# Patient Record
Sex: MALE | Race: WHITE | Employment: FULL TIME | URBAN - METROPOLITAN AREA
[De-identification: names, ages, dates, MRNs, and addresses within clinical notes are randomized per-mention and may not be internally consistent; named-entity substitution may affect disease eponyms.]

---

## 2017-09-06 ENCOUNTER — OFFICE VISIT (OUTPATIENT)
Dept: FAMILY MEDICINE CLINIC | Age: 42
End: 2017-09-06

## 2017-09-06 VITALS
HEART RATE: 70 BPM | WEIGHT: 180.6 LBS | DIASTOLIC BLOOD PRESSURE: 82 MMHG | RESPIRATION RATE: 18 BRPM | SYSTOLIC BLOOD PRESSURE: 126 MMHG | TEMPERATURE: 98.2 F | OXYGEN SATURATION: 90 % | HEIGHT: 78 IN | BODY MASS INDEX: 20.9 KG/M2
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
## 2017-09-06 DIAGNOSIS — R07.9 CHEST PAIN, UNSPECIFIED TYPE: Primary | ICD-10-CM

## 2017-09-06 PROCEDURE — 99214 OFFICE O/P EST MOD 30 MIN: CPT | Performed by: FAMILY MEDICINE

## 2017-09-06 PROCEDURE — 93000 ELECTROCARDIOGRAM COMPLETE: CPT | Performed by: FAMILY MEDICINE

## 2017-09-06 RX ORDER — METHYLPREDNISOLONE 4 MG/1
TABLET ORAL
Qty: 1 KIT | Refills: 0 | Status: SHIPPED | OUTPATIENT
Start: 2017-09-06 | End: 2017-09-12

## 2017-09-06 ASSESSMENT — PATIENT HEALTH QUESTIONNAIRE - PHQ9
SUM OF ALL RESPONSES TO PHQ QUESTIONS 1-9: 0
2. FEELING DOWN, DEPRESSED OR HOPELESS: 0
SUM OF ALL RESPONSES TO PHQ9 QUESTIONS 1 & 2: 0
1. LITTLE INTEREST OR PLEASURE IN DOING THINGS: 0

## 2024-05-07 ENCOUNTER — OFFICE VISIT (OUTPATIENT)
Dept: PRIMARY CARE | Facility: CLINIC | Age: 49
End: 2024-05-07
Payer: COMMERCIAL

## 2024-05-07 VITALS
TEMPERATURE: 97.1 F | DIASTOLIC BLOOD PRESSURE: 82 MMHG | WEIGHT: 177.6 LBS | SYSTOLIC BLOOD PRESSURE: 112 MMHG | OXYGEN SATURATION: 92 % | HEIGHT: 78 IN | BODY MASS INDEX: 20.55 KG/M2 | HEART RATE: 79 BPM | RESPIRATION RATE: 16 BRPM

## 2024-05-07 DIAGNOSIS — Z13.220 LIPID SCREENING: ICD-10-CM

## 2024-05-07 DIAGNOSIS — H66.92 CHRONIC OTITIS MEDIA OF LEFT EAR WITH POSTERIOR PERFORATION: ICD-10-CM

## 2024-05-07 DIAGNOSIS — Z76.89 ENCOUNTER TO ESTABLISH CARE: ICD-10-CM

## 2024-05-07 DIAGNOSIS — R05.1 ACUTE COUGH: ICD-10-CM

## 2024-05-07 DIAGNOSIS — K02.9 DENTAL CARIES: ICD-10-CM

## 2024-05-07 DIAGNOSIS — R53.83 MALAISE AND FATIGUE: ICD-10-CM

## 2024-05-07 DIAGNOSIS — R53.81 MALAISE AND FATIGUE: ICD-10-CM

## 2024-05-07 DIAGNOSIS — H72.92 CHRONIC OTITIS MEDIA OF LEFT EAR WITH POSTERIOR PERFORATION: ICD-10-CM

## 2024-05-07 DIAGNOSIS — J40 SINOBRONCHITIS: Primary | ICD-10-CM

## 2024-05-07 DIAGNOSIS — J32.9 SINOBRONCHITIS: Primary | ICD-10-CM

## 2024-05-07 PROCEDURE — 99203 OFFICE O/P NEW LOW 30 MIN: CPT | Performed by: FAMILY MEDICINE

## 2024-05-07 PROCEDURE — 3008F BODY MASS INDEX DOCD: CPT | Performed by: FAMILY MEDICINE

## 2024-05-07 RX ORDER — LEVOFLOXACIN 500 MG/1
500 TABLET, FILM COATED ORAL DAILY
Qty: 10 TABLET | Refills: 0 | Status: SHIPPED | OUTPATIENT
Start: 2024-05-07 | End: 2024-05-17

## 2024-05-07 ASSESSMENT — PATIENT HEALTH QUESTIONNAIRE - PHQ9
SUM OF ALL RESPONSES TO PHQ9 QUESTIONS 1 AND 2: 0
2. FEELING DOWN, DEPRESSED OR HOPELESS: NOT AT ALL
1. LITTLE INTEREST OR PLEASURE IN DOING THINGS: NOT AT ALL

## 2024-05-07 NOTE — PROGRESS NOTES
"Subjective   Patient ID: Duncan Quezada is a 48 y.o. male who presents for Establish Care and Cough.    HPI    Re est care  pt is being seen for cough  ongoing for 1 week   other symptoms includes loss of voice due to the cough  pt is taking Day Quill some relief   Denies any recent travel.  Was recently camping.   Started as a runny nose on Sunday.   Smoking 1/2 pack daily.  He states when he is laying down, he is coughing.    He states he had an artificial ear drum in the left ear.   Dr. Aranda performed the procedure.     He is following up with dentist.  He states he has extractions scheduled for June 3rd.     No other concern/question     Review of systems  ; Patient seen today for exam denies any problems with headaches or vision, denies any shortness of breath chest pain nausea or vomiting, no black stool no blood in the stool no heartburn type symptoms denies any problems with constipation or diarrhea, and no dysuria-type symptoms    The patient's allergies medications were reviewed with them today    The patient's social family and surgical history or also reviewed here today, along with her past medical history.     Objective     Alert and active in  no acute distress  HEENT TMs positive cerumen bilaterally oropharynx negative nares clear no drainage noted neck supple  With no adenopathy   Heart regular rate and rhythm without murmur and no carotid bruits  Lungs- clear to auscultation bilaterally, no wheeze or rhonchi noted  Thyroid -negative masses or nodularity  Abdomen- soft times four quadrants , bowel sounds positive no masses or organomegaly, negative tenderness guarding or rebound  Neurological exam unremarkable- DTRs in upper and lower extremities within normal limits.   skin -no lesions noted      /82 (BP Location: Right arm, Patient Position: Sitting, BP Cuff Size: Adult)   Pulse 79   Temp 36.2 °C (97.1 °F) (Temporal)   Resp 16   Ht 1.981 m (6' 6\")   Wt 80.6 kg (177 lb 9.6 oz)   SpO2 " 92%   BMI 20.52 kg/m²     Allergies   Allergen Reactions    Aspirin Nausea Only and Unknown    Cefdinir Diarrhea    Acetaminophen-Codeine Nausea And Vomiting       Assessment/Plan   Problem List Items Addressed This Visit       Encounter to establish care    Malaise and fatigue    BMI 20.0-20.9, adult     Other Visit Diagnoses       Sinobronchitis    -  Primary    Lipid screening        Acute cough        Dental caries        Chronic otitis media of left ear with posterior perforation              Importance of smoking cessation discussed.     Recommend Mucinex daily.     Levaquin prescribed today.  He has a history of chronic left eardrum perforation he saw Dr. Yang many years ago if he has continued problems with wax in his ears he will need to see ENT as I cannot see and flushed same time    May need a steroid if he worsens over this next 2 weeks understands portance close follow-up with us in follow-up for regular checkup    Recommend Peroxide and cotton before showers.     Labs have been ordered, she/he will have these performed and we will contact her/him with results.  (CBC, CMP, Lipid)    If anything worsens or changes please call us at once, follow up in the office as planned.    Scribe Attestation  By signing my name below, I, Ashley Cotto MA, Scribe   attest that this documentation has been prepared under the direction and in the presence of Kenji Tejeda DO.

## 2024-05-30 ENCOUNTER — APPOINTMENT (OUTPATIENT)
Dept: PRIMARY CARE | Facility: CLINIC | Age: 49
End: 2024-05-30
Payer: COMMERCIAL